# Patient Record
Sex: MALE | URBAN - METROPOLITAN AREA
[De-identification: names, ages, dates, MRNs, and addresses within clinical notes are randomized per-mention and may not be internally consistent; named-entity substitution may affect disease eponyms.]

---

## 2022-06-13 ENCOUNTER — OFFICE VISIT (OUTPATIENT)
Dept: ORTHOPEDIC SURGERY | Age: 52
End: 2022-06-13
Payer: OTHER GOVERNMENT

## 2022-06-13 VITALS — HEIGHT: 66 IN | BODY MASS INDEX: 27.97 KG/M2 | WEIGHT: 174 LBS

## 2022-06-13 DIAGNOSIS — M19.012 PRIMARY OSTEOARTHRITIS, LEFT SHOULDER: Primary | ICD-10-CM

## 2022-06-13 PROCEDURE — 20610 DRAIN/INJ JOINT/BURSA W/O US: CPT | Performed by: ORTHOPAEDIC SURGERY

## 2022-06-13 RX ORDER — DICLOFENAC SODIUM 75 MG/1
75 TABLET, DELAYED RELEASE ORAL 2 TIMES DAILY
Qty: 60 TABLET | Refills: 3 | Status: SHIPPED | OUTPATIENT
Start: 2022-06-13 | End: 2022-10-12

## 2022-06-13 RX ORDER — TRIAMCINOLONE ACETONIDE 40 MG/ML
40 INJECTION, SUSPENSION INTRA-ARTICULAR; INTRAMUSCULAR ONCE
Status: COMPLETED | OUTPATIENT
Start: 2022-06-13 | End: 2022-06-13

## 2022-06-13 RX ORDER — OXYCODONE HYDROCHLORIDE 5 MG/1
5 TABLET ORAL
COMMUNITY

## 2022-06-13 RX ORDER — BUPIVACAINE HYDROCHLORIDE 2.5 MG/ML
6 INJECTION, SOLUTION INFILTRATION; PERINEURAL ONCE
Status: COMPLETED | OUTPATIENT
Start: 2022-06-13 | End: 2022-06-13

## 2022-06-13 RX ADMIN — BUPIVACAINE HYDROCHLORIDE 15 MG: 2.5 INJECTION, SOLUTION INFILTRATION; PERINEURAL at 08:46

## 2022-06-13 RX ADMIN — TRIAMCINOLONE ACETONIDE 40 MG: 40 INJECTION, SUSPENSION INTRA-ARTICULAR; INTRAMUSCULAR at 08:46

## 2022-06-13 NOTE — PROGRESS NOTES
Martha Junior (: 1970) is a 49 y.o. male, established patient, here for evaluation of the following chief complaint(s):  Shoulder Pain       ASSESSMENT/PLAN:  Below is the assessment and plan developed based on review of pertinent history, physical exam, labs, studies, and medications. To try corticosteroid injection for the left shoulder today. We discussed the risks and benefits of injection and informed consent was obtained. After a sterile preparation, 6 cc of bupivicaine and 40 mg of Kenalog were injected into the left shoulder. The patient tolerated the procedure well and there were no complications. Post injection pain, blood sugar elevation, skin discoloration, fatty atrophy and the signs of infection were discussed in detail. The patient was instructed to contact us if there were any questions or concerns prior to their follow up appointment. We discussed possible future need for shoulder arthroplasty. However, we will try to wait as long as we can as he is active. He was sent in a diclofenac prescription to decrease symptoms once his corticosteroid injection starts to wear off. 1. Primary osteoarthritis, left shoulder  -     bupivacaine HCl (MARCAINE) 0.25 % (2.5 mg/mL) injection 15 mg; 15 mg (6 mL), Other, ONCE, 1 dose, On 22 at 0900  -     triamcinolone acetonide (KENALOG-40) 40 mg/mL injection 40 mg; 40 mg, Intra artICUlar, ONCE, 1 dose, On 22 at 0900  -     diclofenac EC (VOLTAREN) 75 mg EC tablet; Take 1 Tablet by mouth two (2) times a day., Normal, Disp-60 Tablet, R-3      Return in about 3 months (around 2022), or if symptoms worsen or fail to improve. SUBJECTIVE/OBJECTIVE:  Martha Junior (: 1970) is a 49 y.o. male. He notes left shoulder aching pain. He has had previous corticosteroid injection which provided some relief.   I performed a shoulder arthroscopy in 2019 where we performed an extensive debridement of chondral changes and acromioplasty/distal clavicle resection. We also performed a biceps tenodesis at that time. He notes occasional aching pain and some limited range of motion. He notes occasional clicking and popping in the shoulder. Allergies   Allergen Reactions    Robaxin [Methocarbamol] Anxiety       Current Outpatient Medications   Medication Sig    oxyCODONE IR (ROXICODONE) 5 mg immediate release tablet Take 5 mg by mouth every four (4) hours as needed for Pain.  diclofenac EC (VOLTAREN) 75 mg EC tablet Take 1 Tablet by mouth two (2) times a day. Current Facility-Administered Medications   Medication    bupivacaine HCl (MARCAINE) 0.25 % (2.5 mg/mL) injection 15 mg    triamcinolone acetonide (KENALOG-40) 40 mg/mL injection 40 mg       Social History     Socioeconomic History    Marital status:      Spouse name: Not on file    Number of children: Not on file    Years of education: Not on file    Highest education level: Not on file   Occupational History    Not on file   Tobacco Use    Smoking status: Current Every Day Smoker     Packs/day: 0.50    Smokeless tobacco: Not on file   Vaping Use    Vaping Use: Not on file   Substance and Sexual Activity    Alcohol use: Not Currently    Drug use: Not Currently    Sexual activity: Not on file   Other Topics Concern    Not on file   Social History Narrative    Not on file     Social Determinants of Health     Financial Resource Strain:     Difficulty of Paying Living Expenses: Not on file   Food Insecurity:     Worried About Running Out of Food in the Last Year: Not on file    Charly of Food in the Last Year: Not on file   Transportation Needs:     Lack of Transportation (Medical): Not on file    Lack of Transportation (Non-Medical):  Not on file   Physical Activity:     Days of Exercise per Week: Not on file    Minutes of Exercise per Session: Not on file   Stress:     Feeling of Stress : Not on file   Social Connections:  Frequency of Communication with Friends and Family: Not on file    Frequency of Social Gatherings with Friends and Family: Not on file    Attends Anabaptism Services: Not on file    Active Member of Clubs or Organizations: Not on file    Attends Club or Organization Meetings: Not on file    Marital Status: Not on file   Intimate Partner Violence:     Fear of Current or Ex-Partner: Not on file    Emotionally Abused: Not on file    Physically Abused: Not on file    Sexually Abused: Not on file   Housing Stability:     Unable to Pay for Housing in the Last Year: Not on file    Number of [place] in the Last Year: Not on file    Unstable Housing in the Last Year: Not on file       History reviewed. No pertinent surgical history. History reviewed. No pertinent family history. REVIEW OF SYSTEMS:  ROS     Positive for: Musculoskeletal    Last edited by Sia Blanco on 6/13/2022  8:07 AM. (History)        Patient denies any recent fever, chills, nausea, vomiting, chest pain, or shortness of breath. Vitals:  Ht 5' 6\" (1.676 m)   Wt 174 lb (78.9 kg)   BMI 28.08 kg/m²    Body mass index is 28.08 kg/m². PHYSICAL EXAM:  General exam: Patient is awake, alert, and oriented x3. Well-appearing. No acute distress. Ambulates with a normal gait. Left shoulder: Neurovascular and sensory intact. There is decreased range of motion in all planes on active and passive exam.  There is crepitus with range of motion of the shoulder. There is pain with impingement testing including Phillip exam.  There is mild weakness noted with resisted abduction and resisted external rotation on exam.  Normal stability is noted. IMAGING:  MRI shows evidence of moderate chondral changes of the glenohumeral joint. Degenerative labral tearing is noted. Small low-grade undersurface tear of the infraspinatus tendon      An electronic signature was used to authenticate this note.   -- Tanisha Pagan DO [facility]   XR Results (most recent):  No results found for this or any previous visit. Orders Placed This Encounter    bupivacaine HCl (MARCAINE) 0.25 % (2.5 mg/mL) injection 15 mg    triamcinolone acetonide (KENALOG-40) 40 mg/mL injection 40 mg    diclofenac EC (VOLTAREN) 75 mg EC tablet     Sig: Take 1 Tablet by mouth two (2) times a day. Dispense:  60 Tablet     Refill:  3              An electronic signature was used to authenticate this note.   -- Ann Trotter DO

## 2022-10-05 ENCOUNTER — OFFICE VISIT (OUTPATIENT)
Dept: ORTHOPEDIC SURGERY | Age: 52
End: 2022-10-05
Payer: OTHER GOVERNMENT

## 2022-10-05 VITALS — BODY MASS INDEX: 27.97 KG/M2 | WEIGHT: 174 LBS | HEIGHT: 66 IN

## 2022-10-05 DIAGNOSIS — M19.012 PRIMARY OSTEOARTHRITIS, LEFT SHOULDER: Primary | ICD-10-CM

## 2022-10-05 PROCEDURE — 20610 DRAIN/INJ JOINT/BURSA W/O US: CPT | Performed by: ORTHOPAEDIC SURGERY

## 2022-10-05 RX ORDER — BUPIVACAINE HYDROCHLORIDE 7.5 MG/ML
3 INJECTION, SOLUTION EPIDURAL; RETROBULBAR ONCE
Status: COMPLETED | OUTPATIENT
Start: 2022-10-05 | End: 2022-10-05

## 2022-10-05 RX ORDER — TRIAMCINOLONE ACETONIDE 40 MG/ML
40 INJECTION, SUSPENSION INTRA-ARTICULAR; INTRAMUSCULAR ONCE
Status: COMPLETED | OUTPATIENT
Start: 2022-10-05 | End: 2022-10-05

## 2022-10-05 RX ADMIN — TRIAMCINOLONE ACETONIDE 40 MG: 40 INJECTION, SUSPENSION INTRA-ARTICULAR; INTRAMUSCULAR at 10:13

## 2022-10-05 RX ADMIN — BUPIVACAINE HYDROCHLORIDE 22.5 MG: 7.5 INJECTION, SOLUTION EPIDURAL; RETROBULBAR at 10:13

## 2022-10-05 NOTE — PROGRESS NOTES
Jenna Levine (: 1970) is a 47 y.o. male, established patient, here for evaluation of the following chief complaint(s):  Shoulder Pain       ASSESSMENT/PLAN:  Below is the assessment and plan developed based on review of pertinent history, physical exam, labs, studies, and medications. Elected to try corticosteroid injection for the left shoulder. We discussed further treatment options as well including shoulder arthroplasty. He would like to hold off on surgery if at all possible. We discussed the risks and benefits of injection and informed consent was obtained. After a sterile preparation, 4 cc of bupivicaine and 40 mg of Kenalog were injected into the left shoulder. The patient tolerated the procedure well and there were no complications. Post injection pain, blood sugar elevation, skin discoloration, fatty atrophy and the signs of infection were discussed in detail. The patient was instructed to contact us if there were any questions or concerns prior to their follow up appointment. 1. Primary osteoarthritis, left shoulder    Return in about 3 months (around 2023), or if symptoms worsen or fail to improve. SUBJECTIVE/OBJECTIVE:  Jenna Levine (: 1970) is a 47 y.o. male. He notes recurrent aching pain in the left shoulder. He has difficulty with certain motions in the shoulder and continues to work through some home exercises with minimal relief. He notes occasional aching pain at night. Previous corticosteroid injection helped for about a month and a half. Allergies   Allergen Reactions    Robaxin [Methocarbamol] Anxiety       Current Outpatient Medications   Medication Sig    oxyCODONE IR (ROXICODONE) 5 mg immediate release tablet Take 5 mg by mouth every four (4) hours as needed for Pain. (Patient not taking: Reported on 10/5/2022)    diclofenac EC (VOLTAREN) 75 mg EC tablet Take 1 Tablet by mouth two (2) times a day.  (Patient not taking: Reported on 10/5/2022)     Current Facility-Administered Medications   Medication    triamcinolone acetonide (KENALOG-40) 40 mg/mL injection 40 mg    bupivacaine (PF) (MARCAINE) 0.75 % (7.5 mg/mL) injection 22.5 mg       Social History     Socioeconomic History    Marital status:      Spouse name: Not on file    Number of children: Not on file    Years of education: Not on file    Highest education level: Not on file   Occupational History    Not on file   Tobacco Use    Smoking status: Every Day     Packs/day: 0.50     Types: Cigarettes    Smokeless tobacco: Not on file   Vaping Use    Vaping Use: Not on file   Substance and Sexual Activity    Alcohol use: Not Currently    Drug use: Not Currently    Sexual activity: Not on file   Other Topics Concern    Not on file   Social History Narrative    Not on file     Social Determinants of Health     Financial Resource Strain: Not on file   Food Insecurity: Not on file   Transportation Needs: Not on file   Physical Activity: Not on file   Stress: Not on file   Social Connections: Not on file   Intimate Partner Violence: Not on file   Housing Stability: Not on file       History reviewed. No pertinent surgical history. History reviewed. No pertinent family history. REVIEW OF SYSTEMS:    Patient denies any recent fever, chills, nausea, vomiting, chest pain, or shortness of breath. Vitals:  Ht 5' 6\" (1.676 m)   Wt 174 lb (78.9 kg)   BMI 28.08 kg/m²    Body mass index is 28.08 kg/m². PHYSICAL EXAM:  General exam: Patient is awake, alert, and oriented x3. Well-appearing. No acute distress. Ambulates with a normal gait. Left shoulder: Neurovascular and sensory intact. There is decreased range of motion in all planes on active and passive exam.  There is crepitus with range of motion of the shoulder.   There is pain with impingement testing including Phillip exam.  There is mild weakness noted with resisted abduction and resisted external rotation on exam.  Normal stability is noted. IMAGING:  Previous imaging of the left shoulder shows evidence of chondral changes at the glenohumeral joint  XR Results (most recent):  No results found for this or any previous visit. Orders Placed This Encounter    triamcinolone acetonide (KENALOG-40) 40 mg/mL injection 40 mg    bupivacaine (PF) (MARCAINE) 0.75 % (7.5 mg/mL) injection 22.5 mg              An electronic signature was used to authenticate this note.   -- Holly Rodgers DO

## 2022-10-12 DIAGNOSIS — M19.012 PRIMARY OSTEOARTHRITIS, LEFT SHOULDER: ICD-10-CM

## 2022-10-12 RX ORDER — DICLOFENAC SODIUM 75 MG/1
TABLET, DELAYED RELEASE ORAL
Qty: 60 TABLET | Refills: 3 | Status: SHIPPED | OUTPATIENT
Start: 2022-10-12